# Patient Record
Sex: MALE | URBAN - METROPOLITAN AREA
[De-identification: names, ages, dates, MRNs, and addresses within clinical notes are randomized per-mention and may not be internally consistent; named-entity substitution may affect disease eponyms.]

---

## 2022-03-21 ENCOUNTER — APPOINTMENT (RX ONLY)
Dept: URBAN - METROPOLITAN AREA CLINIC 125 | Facility: CLINIC | Age: 32
Setting detail: DERMATOLOGY
End: 2022-03-21

## 2022-03-21 DIAGNOSIS — L21.8 OTHER SEBORRHEIC DERMATITIS: ICD-10-CM | Status: INADEQUATELY CONTROLLED

## 2022-03-21 DIAGNOSIS — L259 CONTACT DERMATITIS AND OTHER ECZEMA, UNSPECIFIED CAUSE: ICD-10-CM | Status: INADEQUATELY CONTROLLED

## 2022-03-21 PROBLEM — L30.9 DERMATITIS, UNSPECIFIED: Status: ACTIVE | Noted: 2022-03-21

## 2022-03-21 PROBLEM — L23.9 ALLERGIC CONTACT DERMATITIS, UNSPECIFIED CAUSE: Status: ACTIVE | Noted: 2022-03-21

## 2022-03-21 PROCEDURE — 99204 OFFICE O/P NEW MOD 45 MIN: CPT

## 2022-03-21 PROCEDURE — ? PRESCRIPTION MEDICATION MANAGEMENT

## 2022-03-21 PROCEDURE — ? COUNSELING

## 2022-03-21 ASSESSMENT — LOCATION SIMPLE DESCRIPTION DERM
LOCATION SIMPLE: RIGHT SUPERIOR EYELID
LOCATION SIMPLE: RIGHT HAND
LOCATION SIMPLE: LEFT HAND
LOCATION SIMPLE: LEFT SUPERIOR EYELID

## 2022-03-21 ASSESSMENT — LOCATION DETAILED DESCRIPTION DERM
LOCATION DETAILED: RIGHT LATERAL SUPERIOR EYELID
LOCATION DETAILED: RIGHT RADIAL DORSAL HAND
LOCATION DETAILED: LEFT LATERAL SUPERIOR EYELID
LOCATION DETAILED: LEFT ULNAR DORSAL HAND

## 2022-03-21 ASSESSMENT — LOCATION ZONE DERM
LOCATION ZONE: HAND
LOCATION ZONE: EYELID

## 2022-03-21 NOTE — PROCEDURE: MIPS QUALITY
Detail Level: Detailed
Quality 402: Tobacco Use And Help With Quitting Among Adolescents: Patient screened for tobacco and never smoked
Quality 130: Documentation Of Current Medications In The Medical Record: Current Medications Documented
Quality 431: Preventive Care And Screening: Unhealthy Alcohol Use - Screening: Patient not identified as an unhealthy alcohol user when screened for unhealthy alcohol use using a systematic screening method
Quality 226: Preventive Care And Screening: Tobacco Use: Screening And Cessation Intervention: Patient screened for tobacco use and is an ex/non-smoker
Quality 110: Preventive Care And Screening: Influenza Immunization: Influenza Immunization Administered during Influenza season

## 2022-03-21 NOTE — PROCEDURE: PRESCRIPTION MEDICATION MANAGEMENT
Continue Regimen: Topical steroids (betamethasone, TAC 0.1%) as prescribed by PCP PRN flares, educated pt it is fine to use for up to 2 weeks at a time with at least a week off if used consistently for 2 weeks.
Render In Strict Bullet Format?: No
Plan: Avoid excessive moisture (water/washing hands) or if pt does wash hands excessively or is washing dishes to use gloves when possible. Additionally, applying moisturizer daily and hortencia. after washing hands or washing dishes (Eucerin, CeraVe, Cetaphil). Use gentle moisturizer and hand soaps when possible like Dove and Vanicream.\\nDiscussed the possibility that pt might be coming into contact with something he is reacting to and could also be touching or rubbing eyelids causing eyelid rash as well. Gave pt option to do patch testing and discussed that based on etiology of rash (such as if it is not an allergic reaction) the patch testing may not be informative. Pt would still like to pursue patch testing. Will contact  to schedule. Educated pt on the patch testing process and what to expect.
Detail Level: Zone
Continue Regimen: Topical steroids (desonide) as prescribed by PCP PRN flares, educated pt it is fine to use for up to 2 weeks at a time with at least a week off if used consistently for 2 weeks.
Plan: Will check patch testing benefits and schedule accordingly. Educated pt patch testing may identify potential allergen coming into contact with the hands and the eyes.

## 2022-03-21 NOTE — HPI: RASH
What Type Of Note Output Would You Prefer (Optional)?: Standard Output
Is The Patient Presenting As Previously Scheduled?: Yes
How Severe Is Your Rash?: moderate
Is This A New Presentation, Or A Follow-Up?: Rash
Additional History: Pt notes persisting rashes on the hands/arms and eyelids. He states that he has been given desonide cream for the eyelids, and betamethasone and TAC 0.1% for the hands by his PCP but they don’t work well or the rashes always returns after he stops using creams and he wants to know what he can do.\\nHe does report he has an 18 month old at home, and the rash worsens when he does chores around the house. Denies any involvement in the ears or scalp.

## 2022-04-07 ENCOUNTER — APPOINTMENT (RX ONLY)
Dept: URBAN - METROPOLITAN AREA CLINIC 125 | Facility: CLINIC | Age: 32
Setting detail: DERMATOLOGY
End: 2022-04-07

## 2022-04-07 DIAGNOSIS — Z71.89 OTHER SPECIFIED COUNSELING: ICD-10-CM

## 2022-04-07 DIAGNOSIS — D22 MELANOCYTIC NEVI: ICD-10-CM

## 2022-04-07 DIAGNOSIS — L81.4 OTHER MELANIN HYPERPIGMENTATION: ICD-10-CM

## 2022-04-07 DIAGNOSIS — L21.8 OTHER SEBORRHEIC DERMATITIS: ICD-10-CM | Status: STABLE

## 2022-04-07 DIAGNOSIS — L259 CONTACT DERMATITIS AND OTHER ECZEMA, UNSPECIFIED CAUSE: ICD-10-CM | Status: STABLE

## 2022-04-07 PROBLEM — L23.9 ALLERGIC CONTACT DERMATITIS, UNSPECIFIED CAUSE: Status: ACTIVE | Noted: 2022-04-07

## 2022-04-07 PROBLEM — D22.5 MELANOCYTIC NEVI OF TRUNK: Status: ACTIVE | Noted: 2022-04-07

## 2022-04-07 PROBLEM — L30.9 DERMATITIS, UNSPECIFIED: Status: ACTIVE | Noted: 2022-04-07

## 2022-04-07 PROCEDURE — ? PRESCRIPTION MEDICATION MANAGEMENT

## 2022-04-07 PROCEDURE — ? SUNSCREEN RECOMMENDATIONS

## 2022-04-07 PROCEDURE — 99213 OFFICE O/P EST LOW 20 MIN: CPT

## 2022-04-07 PROCEDURE — ? COUNSELING

## 2022-04-07 PROCEDURE — ? EDUCATIONAL RESOURCES PROVIDED

## 2022-04-07 ASSESSMENT — LOCATION SIMPLE DESCRIPTION DERM
LOCATION SIMPLE: RIGHT SUPERIOR EYELID
LOCATION SIMPLE: LEFT HAND
LOCATION SIMPLE: RIGHT HAND
LOCATION SIMPLE: LEFT SUPERIOR EYELID
LOCATION SIMPLE: RIGHT UPPER BACK

## 2022-04-07 ASSESSMENT — LOCATION DETAILED DESCRIPTION DERM
LOCATION DETAILED: LEFT LATERAL SUPERIOR EYELID
LOCATION DETAILED: RIGHT INFERIOR MEDIAL UPPER BACK
LOCATION DETAILED: RIGHT RADIAL DORSAL HAND
LOCATION DETAILED: LEFT ULNAR DORSAL HAND
LOCATION DETAILED: RIGHT LATERAL SUPERIOR EYELID

## 2022-04-07 ASSESSMENT — LOCATION ZONE DERM
LOCATION ZONE: HAND
LOCATION ZONE: TRUNK
LOCATION ZONE: EYELID

## 2022-04-07 NOTE — PROCEDURE: PRESCRIPTION MEDICATION MANAGEMENT
Continue Regimen: Topical steroids (betamethasone, TAC 0.1%) x2 weeks on, x1 week off, repeat PRN flares - from PCP
Render In Strict Bullet Format?: No
Plan: Scheduled patch testing appt today. Discussed seeing an allergist to test if patch testing doesn’t show anything.
Detail Level: Zone
Continue Regimen: Topical steroids (desonide) x2 weeks on, x1 week off, repeat PRN flares - from PCP
Plan: Scheduled patch testing appt today. Discussed seeing an allergist to test if patch testing doesn’t show anything.

## 2022-04-07 NOTE — PROCEDURE: SUNSCREEN RECOMMENDATIONS
General Sunscreen Counseling: I recommended a broad spectrum sunscreen with a SPF of 30 or higher when outdoors for more than 15 minutes.  Higher SPF of 70 or greater is recommended for intense or prolonged sun exposure. Sunscreens should be applied at least 15 minutes prior to expected sun exposure and then every 2 hours after that as long as sun exposure continues. If swimming or exercising sunscreen should be reapplied every 45 minutes to an hour after getting wet or sweating.  One ounce, or the equivalent of a shot glass full of sunscreen, is adequate to protect the skin not covered by a bathing suit. Sun protective clothing can be used in lieu of sunscreen but must be worn the entire time you are exposed to the sun's rays.
Detail Level: Detailed
Products Recommended: ANGELO HOWARD of Melanoma bookmark provided

## 2022-04-27 ENCOUNTER — APPOINTMENT (RX ONLY)
Dept: URBAN - METROPOLITAN AREA CLINIC 125 | Facility: CLINIC | Age: 32
Setting detail: DERMATOLOGY
End: 2022-04-27

## 2022-04-27 DIAGNOSIS — L259 CONTACT DERMATITIS AND OTHER ECZEMA, UNSPECIFIED CAUSE: ICD-10-CM | Status: INADEQUATELY CONTROLLED

## 2022-04-27 PROBLEM — L23.9 ALLERGIC CONTACT DERMATITIS, UNSPECIFIED CAUSE: Status: ACTIVE | Noted: 2022-04-27

## 2022-04-27 PROCEDURE — ? COUNSELING

## 2022-04-27 PROCEDURE — ? PHOTO-DOCUMENTATION

## 2022-04-27 PROCEDURE — ? NORTH AMERICAN 80 PATCH TEST READING

## 2022-04-27 PROCEDURE — 95044 PATCH/APPLICATION TESTS: CPT

## 2022-04-27 PROCEDURE — ? PATCH TESTING

## 2022-04-27 ASSESSMENT — LOCATION DETAILED DESCRIPTION DERM
LOCATION DETAILED: RIGHT SUPERIOR MEDIAL UPPER BACK
LOCATION DETAILED: RIGHT MID-UPPER BACK

## 2022-04-27 ASSESSMENT — LOCATION ZONE DERM: LOCATION ZONE: TRUNK

## 2022-04-27 ASSESSMENT — LOCATION SIMPLE DESCRIPTION DERM: LOCATION SIMPLE: RIGHT UPPER BACK

## 2022-04-27 NOTE — PROCEDURE: NORTH AMERICAN 80 PATCH TEST READING
Allergen 74 Reaction: no reaction
Name Of Allergen 1: allergen 1
Name Of Allergen 67: Lidocaine
Detail Level: Zone
Show Negative Results In The Note?: Yes
Show Allergen Counseling In The Note?: No
Name Of Allergen 2: allergen 2
What Reading Time Point?: 48 hour
Number Of Patches Read: 80

## 2022-04-27 NOTE — PROCEDURE: PATCH TESTING
Number Of Patches (Maximum Allowable Per Dos By Cms Is 90): 80
Detail Level: None
Post-Care Instructions: I reviewed with the patient in detail post-care instructions. Patient should not sweat, pick at, or get the patches wet for 48 hours.
Consent: Written consent obtained, risks reviewed including but not limited to rash, itching, allergic reaction, systemic rash, remote possiblity of anaphylaxis to allergen.

## 2022-04-29 ENCOUNTER — APPOINTMENT (RX ONLY)
Dept: URBAN - METROPOLITAN AREA CLINIC 125 | Facility: CLINIC | Age: 32
Setting detail: DERMATOLOGY
End: 2022-04-29

## 2022-04-29 DIAGNOSIS — L259 CONTACT DERMATITIS AND OTHER ECZEMA, UNSPECIFIED CAUSE: ICD-10-CM

## 2022-04-29 PROBLEM — L23.9 ALLERGIC CONTACT DERMATITIS, UNSPECIFIED CAUSE: Status: ACTIVE | Noted: 2022-04-29

## 2022-04-29 PROCEDURE — ? ADDITIONAL NOTES

## 2022-04-29 PROCEDURE — 99212 OFFICE O/P EST SF 10 MIN: CPT

## 2022-04-29 PROCEDURE — ? NORTH AMERICAN 80 PATCH TEST READING

## 2022-04-29 ASSESSMENT — LOCATION DETAILED DESCRIPTION DERM: LOCATION DETAILED: RIGHT INFERIOR MEDIAL MIDBACK

## 2022-04-29 ASSESSMENT — LOCATION ZONE DERM: LOCATION ZONE: TRUNK

## 2022-04-29 ASSESSMENT — LOCATION SIMPLE DESCRIPTION DERM: LOCATION SIMPLE: RIGHT LOWER BACK

## 2022-04-29 NOTE — PROCEDURE: NORTH AMERICAN 80 PATCH TEST READING
Allergen 98 Reaction: no reaction
Name Of Allergen 79: Propylene glycol
Name Of Allergen 6: Cinnamal / (Cinnamic aldehyde)
Name Of Allergen 21: Diazolidinylurea (Germall II)
Name Of Allergen 51: Disperse Yellow 3
Name Of Allergen 56: DMDM Hydantoin
Name Of Allergen 61: Desoximetasone
Name Of Allergen 66: Compositae Mix II
Name Of Allergen 76: Cocamidopropylbetaine
Name Of Allergen 31: Sesquiterpene lactone mix
Name Of Allergen 36: Ethyleneurea, melamine formaldehyde mix
Name Of Allergen 26: Paraben Mix
Name Of Allergen 71: Isoamyl-p-methoxycinnamate
Name Of Allergen 41: Toluenesulfonamide formaldehyde resin
Name Of Allergen 4: p-Phenylenediamine (PPD)
Name Of Allergen 46: Cocamide MAGALI / (Coconut diethanolamide)
Name Of Allergen 8: carba mix
Name Of Allergen 62: Polysorbate 80 / (Polyoxyethylenesorbitanmonooleate(Tween 80))
Name Of Allergen 27: Methyldibromo glutaronitrile (MDBGN)
Name Of Allergen 67: Lidocaine
Name Of Allergen 37: 2-tert-Butyl-4-methoxyphenol (BHA)
Name Of Allergen 77: Formaldehyde
Name Of Allergen 32: Thimerosal (Merthiolate)
Name Of Allergen 5: Imidazolidinyl urea Germall 115)
Name Of Allergen 42: Methyl methacrylate
Name Of Allergen 9: Neomycin sulfate
Name Of Allergen 14: Quaternium-15 (Dowicil 200) / (1-(3-Chloroallyl)-3,5,7-triaza-1- azoniaadamantane chloride)
Name Of Allergen 49: Tea Tree Oil
Name Of Allergen 19: Myroxylon pereirae resin / (Balsam [place])
Name Of Allergen 54: METHYLISOTHIAZOLINONE
Name Of Allergen 24: Mixed dialkyl thiourea
Name Of Allergen 59: Isopropyl myristate
Name Of Allergen 34: Benzophenone-3 / (2-Hydroxy-4-methoxybenzophenone)
Name Of Allergen 2: 2-Mercaptobenzothiazole (MBT)
Name Of Allergen 39: Ethyl acrylate
Name Of Allergen 44: Tixocortol-21-pivalate
Name Of Allergen 11: Clobetasol-17-propionate
Name Of Allergen 64: 2-n-Octyl-4-isothiazolin-3-one
Name Of Allergen 69: Dibucaine hydrochloride
Name Of Allergen 29: Glutaral / (Glutaraldehyde)
Name Of Allergen 16: Mercapto mix
Name Of Allergen 74: Benzalkonium chloride
Name Of Allergen 75: Amidoamine
Name Of Allergen 3: Colophonium / (Colophony)
Name Of Allergen 80: Dimethylol dihydroxyethyleneurea( Fix.CPN)
Name Of Allergen 50: Fragrance Mix II
Name Of Allergen 45: Budesonide
Name Of Allergen 25: Disperse Orange 3
Name Of Allergen 30: 2-Bromo-2-nitropropane-l,3-diol (Bronopol)
Name Of Allergen 65: Disperse Blue 106/124 Mix
What Reading Time Point?: 48 hour
Name Of Allergen 70: Benzoylperoxide
Name Of Allergen 35: Chloroxylenol (PCMX) / (4-Chloro-3,5-xylenol (PCMX))
Name Of Allergen 40: Glyceryl monothioglycolate (GMTG)
Name Of Allergen 17: N-Isopropyl-N-phenyl--4-phenylenediamine
Name Of Allergen 7: Amerchol L 101
Name Of Allergen 12: Ethylenediamine dihydrochloride
Detail Level: Zone
Name Of Allergen 22: Tocopherol/ (DL alpha tocopherol)
Name Of Allergen 57: Cananga odorata oil / (Ylang-Ylang oil)
Number Of Patches Read: 80
Name Of Allergen 72: Hydroxyisohexyl 3-Cyclohexene Carboxaldehyde (Lyral)
Name Of Allergen 52: Benzyl salicylate
Name Of Allergen 47: Triethanolamine
Show Negative Results In The Note?: Yes
Name Of Allergen 18: Potassium dichromate
Name Of Allergen 13: Epoxy resin Bisphenol A
Name Of Allergen 58: Benzyl alcohol
Name Of Allergen 63: Iodopropynyl butyl carbamate
Name Of Allergen 23: Bacitracin
Name Of Allergen 33: Propolis
Name Of Allergen 43: Cobalt(II) chloride hexahydrate
Name Of Allergen 53: Decyl Glucoside
Name Of Allergen 10: Thiuram mix
Name Of Allergen 28: Fragrance mix
Name Of Allergen 48: Hydrocortisone-17-butyrate
Name Of Allergen 68: Fusidic acid sodium salt
Name Of Allergen 73: Ethylhexyl Salicylate
Name Of Allergen 1: Benzocaine
Name Of Allergen 38: Gold(I)sodium thiosulfate dihydrate
Name Of Allergen 78: Methylisothiazolinone + Methylchloroisothiazolinone / (Cl+-Me- isothiazolinone (Jailene CG 200ppm))
Name Of Allergen 15: 1-ctdb-Bzynchczgygwhhkiqrrtbkc resi
Name Of Allergen 20: Nickelsulfate hexahydrate
Name Of Allergen 55: HEMA (2-Hydroxyethyl methacrylate)
Name Of Allergen 60: Triclosan (Irgasan )

## 2022-04-29 NOTE — PROCEDURE: ADDITIONAL NOTES
Detail Level: Simple
Render Risk Assessment In Note?: no
Additional Notes: discussed with patient nothing appears today, could be irritation from taking the tape off or allergy. Discussed not washing back to make sure the marking stay in tack to get a good reading next visit.

## 2022-05-02 ENCOUNTER — APPOINTMENT (RX ONLY)
Dept: URBAN - METROPOLITAN AREA CLINIC 125 | Facility: CLINIC | Age: 32
Setting detail: DERMATOLOGY
End: 2022-05-02

## 2022-05-02 DIAGNOSIS — L259 CONTACT DERMATITIS AND OTHER ECZEMA, UNSPECIFIED CAUSE: ICD-10-CM

## 2022-05-02 PROBLEM — L23.9 ALLERGIC CONTACT DERMATITIS, UNSPECIFIED CAUSE: Status: ACTIVE | Noted: 2022-05-02

## 2022-05-02 PROCEDURE — 99212 OFFICE O/P EST SF 10 MIN: CPT

## 2022-05-02 PROCEDURE — ? NORTH AMERICAN 80 PATCH TEST READING

## 2022-05-02 PROCEDURE — ? PHOTO-DOCUMENTATION

## 2022-05-02 ASSESSMENT — LOCATION DETAILED DESCRIPTION DERM: LOCATION DETAILED: RIGHT INFERIOR MEDIAL MIDBACK

## 2022-05-02 ASSESSMENT — LOCATION SIMPLE DESCRIPTION DERM: LOCATION SIMPLE: RIGHT LOWER BACK

## 2022-05-02 ASSESSMENT — LOCATION ZONE DERM: LOCATION ZONE: TRUNK

## 2022-05-02 NOTE — PROCEDURE: NORTH AMERICAN 80 PATCH TEST READING
Name Of Allergen 14: Quaternium-15 (Dowicil 200) / (1-(3-Chloroallyl)-3,5,7-triaza-1- azoniaadamantane chloride)

## 2022-05-02 NOTE — PROCEDURE: NORTH AMERICAN 80 PATCH TEST READING
Name Of Allergen 78: Methylisothiazolinone + Methylchloroisothiazolinone / (Cl+-Me- isothiazolinone (Jailene CG 200ppm))

## 2022-05-04 ENCOUNTER — APPOINTMENT (RX ONLY)
Dept: URBAN - METROPOLITAN AREA CLINIC 125 | Facility: CLINIC | Age: 32
Setting detail: DERMATOLOGY
End: 2022-05-04

## 2022-05-04 DIAGNOSIS — L259 CONTACT DERMATITIS AND OTHER ECZEMA, UNSPECIFIED CAUSE: ICD-10-CM | Status: INADEQUATELY CONTROLLED

## 2022-05-04 PROBLEM — L23.9 ALLERGIC CONTACT DERMATITIS, UNSPECIFIED CAUSE: Status: ACTIVE | Noted: 2022-05-04

## 2022-05-04 PROCEDURE — ? NORTH AMERICAN 80 PATCH TEST READING

## 2022-05-04 PROCEDURE — 99213 OFFICE O/P EST LOW 20 MIN: CPT

## 2022-05-04 PROCEDURE — ? COUNSELING

## 2022-05-04 PROCEDURE — ? PRESCRIPTION

## 2022-05-04 PROCEDURE — ? TREATMENT REGIMEN

## 2022-05-04 PROCEDURE — ? PHOTO-DOCUMENTATION

## 2022-05-04 RX ORDER — DESOXIMETASONE 2.5 MG/G
OINTMENT TOPICAL
Qty: 30 | Refills: 2 | Status: ERX | COMMUNITY
Start: 2022-05-04

## 2022-05-04 RX ADMIN — DESOXIMETASONE: 2.5 OINTMENT TOPICAL at 00:00

## 2022-05-04 ASSESSMENT — LOCATION ZONE DERM: LOCATION ZONE: TRUNK

## 2022-05-04 ASSESSMENT — LOCATION SIMPLE DESCRIPTION DERM: LOCATION SIMPLE: RIGHT LOWER BACK

## 2022-05-04 ASSESSMENT — LOCATION DETAILED DESCRIPTION DERM: LOCATION DETAILED: RIGHT INFERIOR MEDIAL MIDBACK

## 2022-05-04 NOTE — PROCEDURE: TREATMENT REGIMEN
Initiate Treatment: OTC Vanicream products.\\n\\ndesoximetasone 0.25 % topical ointment: Apply to rash BID PRN flares.
Discontinue Regimen: TAC, betamethasone
Detail Level: Zone

## 2022-05-04 NOTE — PROCEDURE: NORTH AMERICAN 80 PATCH TEST READING
Allergen 98 Reaction: no reaction
Name Of Allergen 79: Propylene glycol
Name Of Allergen 6: Cinnamal / (Cinnamic aldehyde)
Name Of Allergen 21: Diazolidinylurea (Germall II)
Name Of Allergen 51: Disperse Yellow 3
Name Of Allergen 56: DMDM Hydantoin
Name Of Allergen 61: Desoximetasone
Name Of Allergen 66: Compositae Mix II
Name Of Allergen 76: Cocamidopropylbetaine
Name Of Allergen 31: Sesquiterpene lactone mix
Name Of Allergen 36: Ethyleneurea, melamine formaldehyde mix
Name Of Allergen 26: Paraben Mix
Name Of Allergen 71: Isoamyl-p-methoxycinnamate
Name Of Allergen 41: Toluenesulfonamide formaldehyde resin
Name Of Allergen 4: p-Phenylenediamine (PPD)
Name Of Allergen 46: Cocamide MAGALI / (Coconut diethanolamide)
Name Of Allergen 8: carba mix
Name Of Allergen 62: Polysorbate 80 / (Polyoxyethylenesorbitanmonooleate(Tween 80))
Name Of Allergen 27: Methyldibromo glutaronitrile (MDBGN)
Name Of Allergen 67: Lidocaine
Name Of Allergen 37: 2-tert-Butyl-4-methoxyphenol (BHA)
Name Of Allergen 77: Formaldehyde
Name Of Allergen 32: Thimerosal (Merthiolate)
Name Of Allergen 5: Imidazolidinyl urea Germall 115)
Name Of Allergen 42: Methyl methacrylate
Name Of Allergen 9: Neomycin sulfate
Name Of Allergen 14: Quaternium-15 (Dowicil 200) / (1-(3-Chloroallyl)-3,5,7-triaza-1- azoniaadamantane chloride)
Name Of Allergen 49: Tea Tree Oil
Name Of Allergen 19: Myroxylon pereirae resin / (Balsam [place])
Name Of Allergen 54: METHYLISOTHIAZOLINONE
Name Of Allergen 24: Mixed dialkyl thiourea
Name Of Allergen 59: Isopropyl myristate
Name Of Allergen 34: Benzophenone-3 / (2-Hydroxy-4-methoxybenzophenone)
Name Of Allergen 2: 2-Mercaptobenzothiazole (MBT)
Name Of Allergen 39: Ethyl acrylate
Name Of Allergen 44: Tixocortol-21-pivalate
Name Of Allergen 11: Clobetasol-17-propionate
Name Of Allergen 64: 2-n-Octyl-4-isothiazolin-3-one
Name Of Allergen 69: Dibucaine hydrochloride
Name Of Allergen 29: Glutaral / (Glutaraldehyde)
Name Of Allergen 16: Mercapto mix
Name Of Allergen 74: Benzalkonium chloride
Name Of Allergen 75: Amidoamine
Name Of Allergen 3: Colophonium / (Colophony)
Name Of Allergen 80: Dimethylol dihydroxyethyleneurea( Fix.CPN)
Name Of Allergen 50: Fragrance Mix II
Name Of Allergen 45: Budesonide
Name Of Allergen 25: Disperse Orange 3
Name Of Allergen 30: 2-Bromo-2-nitropropane-l,3-diol (Bronopol)
Name Of Allergen 65: Disperse Blue 106/124 Mix
What Reading Time Point?: 48 hour
Name Of Allergen 70: Benzoylperoxide
Name Of Allergen 35: Chloroxylenol (PCMX) / (4-Chloro-3,5-xylenol (PCMX))
Name Of Allergen 40: Glyceryl monothioglycolate (GMTG)
Name Of Allergen 17: N-Isopropyl-N-phenyl--4-phenylenediamine
Name Of Allergen 7: Amerchol L 101
Name Of Allergen 12: Ethylenediamine dihydrochloride
Detail Level: Zone
Name Of Allergen 22: Tocopherol/ (DL alpha tocopherol)
Name Of Allergen 57: Cananga odorata oil / (Ylang-Ylang oil)
Number Of Patches Read: 80
Name Of Allergen 72: Hydroxyisohexyl 3-Cyclohexene Carboxaldehyde (Lyral)
Name Of Allergen 52: Benzyl salicylate
Name Of Allergen 47: Triethanolamine
Allergen 41 Reaction: irritant reaction
Show Negative Results In The Note?: Yes
Name Of Allergen 18: Potassium dichromate
Name Of Allergen 13: Epoxy resin Bisphenol A
Name Of Allergen 58: Benzyl alcohol
Name Of Allergen 63: Iodopropynyl butyl carbamate
Name Of Allergen 23: Bacitracin
Name Of Allergen 33: Propolis
Name Of Allergen 43: Cobalt(II) chloride hexahydrate
Name Of Allergen 53: Decyl Glucoside
Name Of Allergen 10: Thiuram mix
Name Of Allergen 28: Fragrance mix
Name Of Allergen 48: Hydrocortisone-17-butyrate
Name Of Allergen 68: Fusidic acid sodium salt
Name Of Allergen 73: Ethylhexyl Salicylate
Name Of Allergen 1: Benzocaine
Name Of Allergen 38: Gold(I)sodium thiosulfate dihydrate
Name Of Allergen 78: Methylisothiazolinone + Methylchloroisothiazolinone / (Cl+-Me- isothiazolinone (Jailene CG 200ppm))
Name Of Allergen 15: 8-cfuc-Imfndqbahwoxwxuddvvleum resi
Name Of Allergen 20: Nickelsulfate hexahydrate
Name Of Allergen 55: HEMA (2-Hydroxyethyl methacrylate)
Name Of Allergen 60: Triclosan (Irgasan )